# Patient Record
Sex: MALE | URBAN - METROPOLITAN AREA
[De-identification: names, ages, dates, MRNs, and addresses within clinical notes are randomized per-mention and may not be internally consistent; named-entity substitution may affect disease eponyms.]

---

## 2021-11-15 ENCOUNTER — TELEPHONE (OUTPATIENT)
Dept: FAMILY MEDICINE CLINIC | Facility: CLINIC | Age: 66
End: 2021-11-15

## 2024-11-19 ENCOUNTER — HOSPITAL ENCOUNTER (EMERGENCY)
Facility: HOSPITAL | Age: 69
Discharge: HOME | End: 2024-11-19
Attending: EMERGENCY MEDICINE
Payer: COMMERCIAL

## 2024-11-19 ENCOUNTER — TELEPHONE (OUTPATIENT)
Age: 69
End: 2024-11-19

## 2024-11-19 ENCOUNTER — OFFICE VISIT (OUTPATIENT)
Dept: URGENT CARE | Facility: CLINIC | Age: 69
End: 2024-11-19
Payer: COMMERCIAL

## 2024-11-19 ENCOUNTER — APPOINTMENT (EMERGENCY)
Dept: RADIOLOGY | Facility: HOSPITAL | Age: 69
End: 2024-11-19
Payer: COMMERCIAL

## 2024-11-19 VITALS
HEART RATE: 107 BPM | DIASTOLIC BLOOD PRESSURE: 74 MMHG | RESPIRATION RATE: 20 BRPM | WEIGHT: 97.8 LBS | SYSTOLIC BLOOD PRESSURE: 120 MMHG | OXYGEN SATURATION: 98 % | TEMPERATURE: 98.4 F

## 2024-11-19 VITALS
DIASTOLIC BLOOD PRESSURE: 87 MMHG | HEIGHT: 70 IN | TEMPERATURE: 98.2 F | HEART RATE: 92 BPM | OXYGEN SATURATION: 98 % | WEIGHT: 95.24 LBS | SYSTOLIC BLOOD PRESSURE: 135 MMHG | RESPIRATION RATE: 18 BRPM | BODY MASS INDEX: 13.63 KG/M2

## 2024-11-19 DIAGNOSIS — R68.81 EARLY SATIETY: ICD-10-CM

## 2024-11-19 DIAGNOSIS — M79.89 LEG SWELLING: ICD-10-CM

## 2024-11-19 DIAGNOSIS — F10.90 ALCOHOL USE DISORDER: ICD-10-CM

## 2024-11-19 DIAGNOSIS — R64 CACHECTIC (HCC): ICD-10-CM

## 2024-11-19 DIAGNOSIS — F40.00 AGORAPHOBIA: ICD-10-CM

## 2024-11-19 DIAGNOSIS — R33.9 URINARY RETENTION: Primary | ICD-10-CM

## 2024-11-19 DIAGNOSIS — N39.0 UTI (URINARY TRACT INFECTION): Primary | ICD-10-CM

## 2024-11-19 LAB
ANION GAP SERPL CALCULATED.3IONS-SCNC: 11 MMOL/L (ref 4–13)
BACTERIA UR QL AUTO: ABNORMAL /HPF
BASOPHILS # BLD AUTO: 0.02 THOUSANDS/ÂΜL (ref 0–0.1)
BASOPHILS NFR BLD AUTO: 0 % (ref 0–1)
BILIRUB UR QL STRIP: NEGATIVE
BNP SERPL-MCNC: 66 PG/ML (ref 0–100)
BUN SERPL-MCNC: 8 MG/DL (ref 5–25)
CALCIUM SERPL-MCNC: 9 MG/DL (ref 8.4–10.2)
CAOX CRY URNS QL MICRO: ABNORMAL /HPF
CHLORIDE SERPL-SCNC: 103 MMOL/L (ref 96–108)
CLARITY UR: ABNORMAL
CO2 SERPL-SCNC: 24 MMOL/L (ref 21–32)
COLOR UR: ABNORMAL
CREAT SERPL-MCNC: 0.87 MG/DL (ref 0.6–1.3)
EOSINOPHIL # BLD AUTO: 0.16 THOUSAND/ÂΜL (ref 0–0.61)
EOSINOPHIL NFR BLD AUTO: 2 % (ref 0–6)
ERYTHROCYTE [DISTWIDTH] IN BLOOD BY AUTOMATED COUNT: 13.9 % (ref 11.6–15.1)
GFR SERPL CREATININE-BSD FRML MDRD: 88 ML/MIN/1.73SQ M
GLUCOSE SERPL-MCNC: 107 MG/DL (ref 65–140)
GLUCOSE UR STRIP-MCNC: NEGATIVE MG/DL
HCT VFR BLD AUTO: 50.5 % (ref 36.5–49.3)
HGB BLD-MCNC: 17.3 G/DL (ref 12–17)
HGB UR QL STRIP.AUTO: ABNORMAL
IMM GRANULOCYTES # BLD AUTO: 0.02 THOUSAND/UL (ref 0–0.2)
IMM GRANULOCYTES NFR BLD AUTO: 0 % (ref 0–2)
KETONES UR STRIP-MCNC: ABNORMAL MG/DL
LEUKOCYTE ESTERASE UR QL STRIP: NEGATIVE
LYMPHOCYTES # BLD AUTO: 1.22 THOUSANDS/ÂΜL (ref 0.6–4.47)
LYMPHOCYTES NFR BLD AUTO: 15 % (ref 14–44)
MCH RBC QN AUTO: 35.4 PG (ref 26.8–34.3)
MCHC RBC AUTO-ENTMCNC: 34.3 G/DL (ref 31.4–37.4)
MCV RBC AUTO: 103 FL (ref 82–98)
MONOCYTES # BLD AUTO: 0.52 THOUSAND/ÂΜL (ref 0.17–1.22)
MONOCYTES NFR BLD AUTO: 7 % (ref 4–12)
MUCOUS THREADS UR QL AUTO: ABNORMAL
NEUTROPHILS # BLD AUTO: 6.12 THOUSANDS/ÂΜL (ref 1.85–7.62)
NEUTS SEG NFR BLD AUTO: 76 % (ref 43–75)
NITRITE UR QL STRIP: NEGATIVE
NON-SQ EPI CELLS URNS QL MICRO: ABNORMAL /HPF
NRBC BLD AUTO-RTO: 0 /100 WBCS
PH UR STRIP.AUTO: 6 [PH]
PLATELET # BLD AUTO: 224 THOUSANDS/UL (ref 149–390)
PMV BLD AUTO: 9.6 FL (ref 8.9–12.7)
POTASSIUM SERPL-SCNC: 4.2 MMOL/L (ref 3.5–5.3)
PROT UR STRIP-MCNC: ABNORMAL MG/DL
RBC # BLD AUTO: 4.89 MILLION/UL (ref 3.88–5.62)
RBC #/AREA URNS AUTO: ABNORMAL /HPF
SL AMB  POCT GLUCOSE, UA: NEGATIVE
SL AMB LEUKOCYTE ESTERASE,UA: ABNORMAL
SL AMB POCT BILIRUBIN,UA: ABNORMAL
SL AMB POCT BLOOD,UA: ABNORMAL
SL AMB POCT CLARITY,UA: ABNORMAL
SL AMB POCT COLOR,UA: ABNORMAL
SL AMB POCT KETONES,UA: 15
SL AMB POCT NITRITE,UA: NEGATIVE
SL AMB POCT PH,UA: 6.5
SL AMB POCT SPECIFIC GRAVITY,UA: 1.03
SL AMB POCT URINE PROTEIN: 30
SL AMB POCT UROBILINOGEN: 1
SODIUM SERPL-SCNC: 138 MMOL/L (ref 135–147)
SP GR UR STRIP.AUTO: >=1.03
UROBILINOGEN UR QL STRIP.AUTO: 1 E.U./DL
WBC # BLD AUTO: 8.06 THOUSAND/UL (ref 4.31–10.16)
WBC #/AREA URNS AUTO: ABNORMAL /HPF

## 2024-11-19 PROCEDURE — 87186 SC STD MICRODIL/AGAR DIL: CPT | Performed by: NURSE PRACTITIONER

## 2024-11-19 PROCEDURE — 36415 COLL VENOUS BLD VENIPUNCTURE: CPT | Performed by: EMERGENCY MEDICINE

## 2024-11-19 PROCEDURE — 93005 ELECTROCARDIOGRAM TRACING: CPT

## 2024-11-19 PROCEDURE — 81003 URINALYSIS AUTO W/O SCOPE: CPT | Performed by: EMERGENCY MEDICINE

## 2024-11-19 PROCEDURE — 99284 EMERGENCY DEPT VISIT MOD MDM: CPT

## 2024-11-19 PROCEDURE — 99284 EMERGENCY DEPT VISIT MOD MDM: CPT | Performed by: EMERGENCY MEDICINE

## 2024-11-19 PROCEDURE — 87077 CULTURE AEROBIC IDENTIFY: CPT | Performed by: NURSE PRACTITIONER

## 2024-11-19 PROCEDURE — 87086 URINE CULTURE/COLONY COUNT: CPT | Performed by: NURSE PRACTITIONER

## 2024-11-19 PROCEDURE — 83880 ASSAY OF NATRIURETIC PEPTIDE: CPT | Performed by: EMERGENCY MEDICINE

## 2024-11-19 PROCEDURE — 96360 HYDRATION IV INFUSION INIT: CPT

## 2024-11-19 PROCEDURE — 81001 URINALYSIS AUTO W/SCOPE: CPT | Performed by: EMERGENCY MEDICINE

## 2024-11-19 PROCEDURE — 99205 OFFICE O/P NEW HI 60 MIN: CPT | Performed by: NURSE PRACTITIONER

## 2024-11-19 PROCEDURE — 80048 BASIC METABOLIC PNL TOTAL CA: CPT | Performed by: EMERGENCY MEDICINE

## 2024-11-19 PROCEDURE — 85025 COMPLETE CBC W/AUTO DIFF WBC: CPT | Performed by: EMERGENCY MEDICINE

## 2024-11-19 PROCEDURE — 81002 URINALYSIS NONAUTO W/O SCOPE: CPT | Performed by: NURSE PRACTITIONER

## 2024-11-19 PROCEDURE — 71045 X-RAY EXAM CHEST 1 VIEW: CPT

## 2024-11-19 RX ORDER — CEFUROXIME AXETIL 500 MG/1
500 TABLET ORAL EVERY 12 HOURS SCHEDULED
Qty: 14 TABLET | Refills: 0 | Status: SHIPPED | OUTPATIENT
Start: 2024-11-19 | End: 2024-11-26

## 2024-11-19 RX ORDER — CEFUROXIME AXETIL 250 MG/1
500 TABLET ORAL ONCE
Status: COMPLETED | OUTPATIENT
Start: 2024-11-19 | End: 2024-11-19

## 2024-11-19 RX ORDER — LORAZEPAM 1 MG/1
2 TABLET ORAL ONCE
Qty: 2 TABLET | Refills: 0 | Status: SHIPPED | OUTPATIENT
Start: 2024-11-19 | End: 2024-11-19

## 2024-11-19 RX ADMIN — SODIUM CHLORIDE 1000 ML: 0.9 INJECTION, SOLUTION INTRAVENOUS at 14:47

## 2024-11-19 RX ADMIN — CEFUROXIME AXETIL 500 MG: 250 TABLET ORAL at 15:41

## 2024-11-19 NOTE — TELEPHONE ENCOUNTER
hSannon from urgent care calling as patient is there with urinary retention and refusing to go to the emergency room as he has a fear of emergency rooms and traveling outside of [place]. Explained there are no providers in the [place] office today and unfortunately he is a new patient and there are no opening today in the next closest office in [place]. Emergency room precautions reviewed.  Shannon verbalized understanding.

## 2024-11-19 NOTE — PATIENT INSTRUCTIONS
Report to the ER for further evaluation--I am concerned that you are retaining urine.  Afterwards, definitely schedule a family provider appointment for further evaluation.  Patient Education     Failure to Thrive, Adult   About this topic   Failure to thrive or FTT can happen in adults as well as in children. Most of the time, adults slowly seem to be less healthy. They may lose weight and have more health problems. Adults with FTT may also have a poor appetite and poor nutrition. They may show signs of low mood and be less active.  Treatment will depend on the cause. Your doctor may order drugs and treat other health problems. The doctor may work to help your eating habits. You may need a feeding tube to give nutrition. Your doctor may admit you to the hospital if you have very bad nutrition.  What are the causes?   Illnesses like cancer; diabetes; heart, stomach, kidney, or liver problems  Trouble moving about from something like a fall, surgery, arthritis, or a stroke  Drugs to treat certain conditions  Lack of food or not able to get or cook food  Mental health issues, eating disorders, or memory problems  Elder abuse or neglect  What are the main signs?   Weight loss  Not feeling hungry  Poor food intake  Less active  Low fluid intake  Low mood  Memory loss  How does the doctor diagnose this health problem?   The doctor will ask you about your health history and do an exam. The doctor will ask you about:  How often you eat  Any swallowing problems  Your bowel movements  Upset stomach and throwing up  Your activity level  What drugs you take  The doctor may ask you for a list of foods you eat and drink. This will help the doctor check the amount of calories you are taking.  Your doctor may order blood, urine, or other tests.  How does the doctor treat this health problem?   Your care is based on the cause or findings. The doctor may ask you to see a dietitian or other expert to help improve your weight  gain.  Are there other health problems to treat?   Your doctor may ask you to change the types and the amount of foods you eat. The doctor may order vitamins and minerals.  If other illnesses are found, those will be treated.  What drugs may be needed?   The doctor may order drugs to:  Add more calories, vitamins, and minerals into your diet  Increase your appetite  Treat other problems that may be keeping you from gaining weight  The doctor may also look at your drugs to see if any of them could be causing this problem.  What changes to diet are needed?   Talk with your doctor or dietitian about the best foods to eat.  You may need to have 3 meals and 3 snacks a day. Set a schedule so the snack is more than an hour before or after a meal.  Eat healthy high calorie and protein snacks if you need to gain or maintain your weight. Good snacks are crackers and peanut butter, cheese, eggs, pudding, yogurt, cottage cheese, cereal, nuts, and fresh fruit or vegetables. Your doctor or dietitian may recommend that you drink a high calorie nutrition supplement.  What problems could happen?   More weight loss  Lack of interest in things around you  Hair loss  Low mood  Higher risk for infection  Not able to move about  Loss of life  What can be done to prevent this health problem?   Mealtime should be relaxed and social. Eat with other family members or friends. Focus on pleasant conversation, rather than how much you are eating.  Stay active. Take walks or run errands if possible. Play games, do puzzles or crafts.  Be sure glasses, hearing aids, and dentures fit well and are working.  Last Reviewed Date   2021-02-24  Consumer Information Use and Disclaimer   This generalized information is a limited summary of diagnosis, treatment, and/or medication information. It is not meant to be comprehensive and should be used as a tool to help the user understand and/or assess potential diagnostic and treatment options. It does NOT  "include all information about conditions, treatments, medications, side effects, or risks that may apply to a specific patient. It is not intended to be medical advice or a substitute for the medical advice, diagnosis, or treatment of a health care provider based on the health care provider's examination and assessment of a patient’s specific and unique circumstances. Patients must speak with a health care provider for complete information about their health, medical questions, and treatment options, including any risks or benefits regarding use of medications. This information does not endorse any treatments or medications as safe, effective, or approved for treating a specific patient. UpToDate, Inc. and its affiliates disclaim any warranty or liability relating to this information or the use thereof. The use of this information is governed by the Terms of Use, available at https://www.[employer]/en/know/clinical-effectiveness-terms   Copyright   Copyright © 2024 UpToDate, Inc. and its affiliates and/or licensors. All rights reserved.  Patient Education     Malnutrition   The Basics   Written by the doctors and editors at [employer]   What is malnutrition? -- Malnutrition is the medical term for when your body is not getting the right nutrients to meet its needs. Doctors might also use the term \"malnourished\" to describe a person with malnutrition.  The food we eat is made up of nutrients. The human body needs certain types of nutrients, in certain amounts, to work properly. There are 2 main groups of nutrients we get through food:   Macronutrients - These are proteins, fats, and carbohydrates. These give the body energy. You need a balance of all 3 types.   Micronutrients - These are vitamins and minerals. They help to make sure that the body is working well.  When most people hear \"malnutrition,\" they think of a specific type of malnutrition called \"undernutrition.\" This is when the body is not getting " "enough nutrients.  This article will use the word \"malnutrition\" to mean \"undernutrition.\" But there is another type of malnutrition called \"overnutrition.\" This is when the body is getting too many nutrients.  What are the symptoms of malnutrition? -- Symptoms can include:   Losing weight or having a low body weight   Loss of muscle or fat   Lack of appetite   Feeling very tired or weak   Changes to the skin and hair   Mood changes   Swelling (from the body holding on to water)  A person can be overweight and still be malnourished. For example, if a person is overweight but not getting enough protein or other important nutrients, they could still be malnourished.  What are the causes of malnutrition? -- There are many different causes of malnutrition. These include:   Not having access to nutritious food - In some cases, healthy food can be hard to get or very expensive. Or people might have easy access to things like processed snacks or fast food, but have a harder time getting meat, fruit, or vegetables. Older adults might also have trouble leaving home to shop for themselves. All of these things can make it hard to get enough food, or to get the right nutrients.   Not knowing about proper nutrition - Many people are not taught about nutrition. They might not know if they are getting the right balance of nutrients.   Lack of appetite - This can be caused by a health problem, medicine, treatment, depression, or alcohol or substance use. For example, cancer can make people lose weight. So can certain cancer treatments.   Problems eating, chewing, or swallowing - Some people have trouble eating enough because of nausea. Or they might have trouble chewing or swallowing because of problems with the mouth or esophagus. (The esophagus is the tube that connects the mouth to the stomach.)   Cachexia - This is a problem that happens with certain long-term illnesses. With cachexia, a person often loses their appetite. But " there are also changes in the way the body uses nutrients. The body starts burning more calories than it is taking in.   Malabsorption - This is when the small intestine has trouble absorbing nutrients from food. (The small intestine is part of the digestive system.) This means that nutrients pass out of the body without being used.   Eating disorders - These are a type of mental health problem that affects a person's behavior around eating. For example, a person with anorexia nervosa might want to weigh less than is healthy. Because of this, they might not eat enough nutrients.   Serious illness, surgery, or injury - Certain serious illnesses, surgeries, or injuries can make it hard to eat. This is especially true if you have to stay in the hospital or are very sick.  How is malnutrition diagnosed? -- The doctor, dietitian (nutrition expert), or nurse will start by asking you questions and doing an exam. They might also:   Weigh you, to see if your weight has changed   Measure your body fat   Measure your muscle mass and strength   Do lab tests to check for specific micronutrient imbalances or other problems  How is malnutrition treated? -- This depends on what is causing your malnutrition, and how severe your symptoms are. Generally, your doctor, dietitian, or nurse will try to treat the underlying cause of malnutrition.  Other treatments are done to increase your nutrients. These might include:   Oral nutritional supplements - These are special foods designed to be a quick source of calories and protein. Many come as a drink. But there are also other forms such as powders or bars. These can be found at a grocery store or pharmacy. Sample brand names include Boost, Ensure, and Premier Protein.   Nutrition support - This is when you get nutrients through a feeding tube or IV.   Working with a dietitian - A dietitian is an expert on food and nutrition. They will help make sure that you are getting the right  nutrients for your body.   Vitamin and mineral supplements  If your malnutrition is severe, you might need to be treated in the hospital.  Can malnutrition be prevented? -- Maybe. Not all causes of malnutrition can be prevented. Some people also have a higher risk because of things like where they live. But in general, it helps to learn about nutrition and eat a well-balanced diet. You can also learn more about how to prepare food in ways that help you get the most nutrients.  When should I call the doctor? -- Call for advice if:   You gain or lose weight without trying to.   You feel faint, weak, or very tired - These can be signs of anemia. This is a problem with red blood cells. One type of anemia is caused by not having enough iron.   You have tingling or numbness and pain in the hand or feet, or bone pain - These can be signs of a vitamin deficiency or anemia.  All topics are updated as new evidence becomes available and our peer review process is complete.  This topic retrieved from [employer] on: Feb 26, 2024.  Topic 979567 Version 1.0  Release: 32.2.4 - C32.56  © 2024 UpToDate, Inc. and/or its affiliates. All rights reserved.  Consumer Information Use and Disclaimer   Disclaimer: This generalized information is a limited summary of diagnosis, treatment, and/or medication information. It is not meant to be comprehensive and should be used as a tool to help the user understand and/or assess potential diagnostic and treatment options. It does NOT include all information about conditions, treatments, medications, side effects, or risks that may apply to a specific patient. It is not intended to be medical advice or a substitute for the medical advice, diagnosis, or treatment of a health care provider based on the health care provider's examination and assessment of a patient's specific and unique circumstances. Patients must speak with a health care provider for complete information about their health, medical  "questions, and treatment options, including any risks or benefits regarding use of medications. This information does not endorse any treatments or medications as safe, effective, or approved for treating a specific patient. UpToDate, Inc. and its affiliates disclaim any warranty or liability relating to this information or the use thereof.The use of this information is governed by the Terms of Use, available at https://www.[employer].com/en/know/clinical-effectiveness-terms. 2024© UpToDate, Inc. and its affiliates and/or licensors. All rights reserved.  Copyright   © 2024 UpToDate, Inc. and/or its affiliates. All rights reserved.  Patient Education     Alcohol and your health   The Basics   Written by the doctors and editors at [employer]   Is alcohol safe for me? -- There is not always a simple answer. Drinking any amount of alcohol comes with risks.  Some people should not drink any alcohol at all. This includes people who:   Are younger than the legal drinking age   Are pregnant or trying to get pregnant   Have liver disease or certain other health conditions   Are working with dangerous equipment or machines   Take certain medicines   Have had problems with alcohol use in the past  If you do choose to drink alcohol, it's important to know the risks.  How does alcohol affect my health? -- Alcohol can cause serious health problems. In general, the more a person drinks over time, the higher their risk of health problems.  Drinking increases the risk of:   Cancer - Heavy drinking increases the risk of several types of cancer. These include breast, esophageal, colorectal, pancreatic, liver, and head and neck cancer. Even having 1 to 2 drinks a day might increase the risk of some cancers, such as breast cancer.   Liver disease - People who drink a lot of alcohol can get something called \"cirrhosis.\" This is a disease that scars the liver. It can cause bleeding in the digestive tract, swelling, breathing problems, " "and death.   Heart problems - Heavy drinking increases the risk \"atrial fibrillation,\" which is an abnormal heart rhythm. It also increases the risk of high blood pressure and stroke. Over time, heavy drinking can lead to heart failure.   Pancreatitis - This is when the pancreas gets irritated or swollen. It can cause severe belly pain. Over time, it can make it hard for the body to digest food normally. It also increases a person's risk of diabetes.   Gout flares - In people with gout, drinking alcohol can trigger attacks, or \"flares.\"   Osteoporosis - People who drink heavily are more likely to have osteoporosis. This is a disease that makes your bones weak. It increases the risk of fractures (broken bones).   Problems with the brain and nerves - Over time, heavy drinking can lead to trouble with thinking and memory. It can also cause muscle weakness and problems with coordination.   Problems with sex  Drinking makes it hard to think clearly. It also affects decision-making and coordination. People who drink are more likely to:   Get into accidents   Die by suicide   Drown   Get seriously hurt   Hurt someone else  Can alcohol be good for you? -- It's not clear. There is some evidence that a small amount of alcohol might be good for heart health. Some studies suggest that this might be true for wine, but not for other types of alcohol. Other studies have not found any benefit. Also, drinking more than about 1 drink a day cancels out any possible benefit.  Doctors do not recommend drinking alcohol to try to improve your health.  How do doctors define different levels of drinking? -- Doctors use different definitions for \"moderate,\" \"heavy,\" and \"binge\" drinking. They are all based on a standard definition of a \"drink.\" In the [place], 1 drink equals:   12 ounces (about 350 mL) of beer   5 ounces (about 150 mL) of wine   1 shot (1.5 ounces or about 50 mL) of liquor  If a serving size is larger than these examples, it " "contains more alcohol.  Commonly used definitions include:   Moderate drinking - If people choose to drink, doctors usually recommend only \"moderate\" alcohol use. This means:   For males - No more than 2 drinks a day   For females - No more than 1 drink a day  The guidelines are different for males and females for several reasons. These include differences in average body size, the amount of water in the body, and how the body processes alcohol.   Heavy drinking - This means drinking enough to increase your risk of health problems. It is sometimes called \"risky\" drinking. This usually means:   For males under 65 years - More than 14 drinks a week or 4 drinks on any day.   For females and males 65 years and older - More than 7 drinks a week or 3 drinks on any day.   \"Binge\" drinking - This means drinking a lot of alcohol in a short time (about 2 hours). It is usually defined as 5 or more drinks for males, or 4 or more drinks for females. Binge drinking is dangerous, even if it does not happen often. That's because people are more likely to get hurt or make dangerous decisions while drunk.  What if I want to get pregnant? -- Drinking alcohol during pregnancy can cause serious problems. If you want to get pregnant, it's best to stop drinking before you start trying.  Drinking during pregnancy increases the risk of:   Your baby having \"fetal alcohol spectrum disorder\" - This causes brain damage and growth problems. Compared with healthy babies, babies with this condition tend to weigh less, have smaller heads, and be very fussy. When they grow up, they have life-long problems with thinking and behavior.   Pregnancy loss or stillbirth - Pregnancy loss is when a pregnancy ends before 20 weeks. It is also called \"miscarriage.\" Stillbirth is when a baby dies before it is born, in the second half of pregnancy (after 20 weeks).  Because of these risks, doctors recommend completely avoiding alcohol during pregnancy. No amount " "of alcohol is known to be safe during pregnancy.  What should I do if I think that I have a drinking problem? -- Some people have problems with alcohol. They might:   Drink more than they mean to   Need more and more alcohol to get the same effects   Notice symptoms if they drink less   Have a strong need or craving to drink alcohol   Be unable to stop or limit their drinking   Have problems with work or relationships because of their drinking  If you think that you might have a problem with alcohol, talk to your doctor or nurse. When a person becomes addicted to alcohol, doctors call this \"alcohol use disorder.\" There are treatments that can help.  Many people can cut back on drinking on their own. But if you have been drinking several days a week for weeks in a row, do not try to cut back without the help of a doctor or nurse. Stopping or reducing drinking too quickly can cause something called \"alcohol withdrawal.\" This can cause symptoms and, in some cases, even lead to death.  All topics are updated as new evidence becomes available and our peer review process is complete.  This topic retrieved from [employer] on: May 01, 2024.  Topic 230433 Version 1.0  Release: 32.4.2 - C32.120  © 2024 UpToDate, Inc. and/or its affiliates. All rights reserved.  Consumer Information Use and Disclaimer   Disclaimer: This generalized information is a limited summary of diagnosis, treatment, and/or medication information. It is not meant to be comprehensive and should be used as a tool to help the user understand and/or assess potential diagnostic and treatment options. It does NOT include all information about conditions, treatments, medications, side effects, or risks that may apply to a specific patient. It is not intended to be medical advice or a substitute for the medical advice, diagnosis, or treatment of a health care provider based on the health care provider's examination and assessment of a patient's specific and unique " circumstances. Patients must speak with a health care provider for complete information about their health, medical questions, and treatment options, including any risks or benefits regarding use of medications. This information does not endorse any treatments or medications as safe, effective, or approved for treating a specific patient. UpToDate, Inc. and its affiliates disclaim any warranty or liability relating to this information or the use thereof.The use of this information is governed by the Terms of Use, available at https://www.[employer].com/en/know/clinical-effectiveness-terms. 2024© UpToDate, Inc. and its affiliates and/or licensors. All rights reserved.  Copyright   © 2024 UpToDate, Inc. and/or its affiliates. All rights reserved.  Patient Education     Alcohol use disorder - Discharge instructions   The Basics   Written by the doctors and editors at [employer]   What are discharge instructions? -- Discharge instructions are information about how to take care of yourself after getting medical care for a health problem.  What is alcohol use disorder? -- This is the medical term for alcohol addiction or what most people think of as alcoholism. Alcohol problems are common, but there are treatments that can help.  Some people lose control of their drinking. They drink more than they mean to. Others find that they need more and more alcohol to get the same effects. Some people notice symptoms if they drink less. This means that their brain and body are physically addicted to alcohol. They have a strong need or craving to drink alcohol. Some people cannot stop or limit their drinking once they start.  People can have trouble with alcohol when they drink too much, too fast, or too often. A person can be at risk for accidents and problems if they drink too much, even if they do not have alcohol use disorder.  Can I stop drinking on my own? -- Many people are able to cut back on drinking on their own. But if  "you have been drinking several days a week for weeks in a row, do not try to drink less without the help of a doctor or nurse. Stopping or reducing drinking too quickly can cause something called \"alcohol withdrawal.\" This can cause symptoms and, in some cases, even lead to death.  How do I care for myself at home? -- Ask the doctor what you need to do when you go home. Make sure that you understand exactly what you need to do to care for yourself. Ask questions if there is anything you do not understand.   You might need help to quit or limit drinking. Talk with your doctor or nurse about the best plan for you. You might:   See a counselor, such as a psychologist, , or psychiatrist.   Go to a treatment center or special recovery center.   Take part in a recovery program while living at home.   Take part in a support group such as Alcoholics Anonymous (\"AA\").  All of these treatments can help, and they can be combined.   There are many things you can do to manage your drinking and lower your risk for problems. Some examples:   Some people choose to stop drinking alcohol completely. But if you have been drinking several days a week for weeks in a row, do not try to reduce without the help of a doctor or nurse. Stopping drinking abruptly can lead to withdrawal.   Other people continue to drink with some limits. Talk with your doctor or nurse to find the best option for you. If you do drink:   Limit the amount you drink, or alternate your drinks with a glass of water or other non-alcoholic drink.   Do not drink on an empty stomach. Food helps your body absorb alcohol more slowly.   Never drive if you have been drinking.   Talk to a friend or family member that you feel comfortable with and who can help you stay responsible and not drink.   Keep a drink journal. Write down how much you drink, where you were, and anything that might have triggered your drinking. This can help you learn more about your " "drinking patterns and make changes.   Make some changes in your daily habits, and try a new routine:   Avoid places, people, and situations that bring up thoughts of drinking.   Spend time with people who are not drinking alcohol. It can help to be with people who support your recovery and give you comfort, encouragement, and guidance.  What follow-up care do I need? -- Your doctor or counselor might want to check on your progress. Go to these appointments. Be honest with them about your progress, how you are feeling, and any problems.  When should I call the doctor? -- Alcohol poisoning is an emergency. Call for an ambulance (in the [place] and [place], call 9-1-1) if someone:   Stops breathing, or goes 10 seconds or more without breathing   Breathes very slowly (fewer than 8 breaths in 1 minute)   Turns blue or very pale, and their skin feels cool to the touch   Has a seizure   Passes out and cannot be woken up at all   Cannot stop vomiting   Looks very sick   Is not able to stand, or falls over and over again   Has a head injury, or a fall that could lead to a head injury  Delirium tremens (\"DTs\") is the most serious form of alcohol withdrawal. This is also an emergency. Call for an ambulance (in the  and [place], call 9-1-1) if someone:   Has hallucinations - This is when a person sees, hears, feels, smells, or tastes things that aren't there.   Is confused about where they are or who they are   Feels very upset and anxious   Has shaking that cannot be controlled   Has a fast heartbeat   Has a fever of 100.4°F (38°C) or higher   Is sweating a lot  Call your doctor for advice if you have milder symptoms of alcohol withdrawal. These might include:   Trouble sleeping   Headache   Nausea  You should also call your doctor if you are having trouble drinking less, or if you have any other symptoms that worry you.  All topics are updated as new evidence becomes available and our peer review process is complete.  This topic " retrieved from [address] on: Apr 11, 2024.  Topic 958741 Version 2.0  Release: 32.3.2 - C32.100  © 2024 [employer] and/or its affiliates. All rights reserved.  Consumer Information Use and Disclaimer   Disclaimer: This generalized information is a limited summary of diagnosis, treatment, and/or medication information. It is not meant to be comprehensive and should be used as a tool to help the user understand and/or assess potential diagnostic and treatment options. It does NOT include all information about conditions, treatments, medications, side effects, or risks that may apply to a specific patient. It is not intended to be medical advice or a substitute for the medical advice, diagnosis, or treatment of a health care provider based on the health care provider's examination and assessment of a patient's specific and unique circumstances. Patients must speak with a health care provider for complete information about their health, medical questions, and treatment options, including any risks or benefits regarding use of medications. This information does not endorse any treatments or medications as safe, effective, or approved for treating a specific patient. [employer] and its affiliates disclaim any warranty or liability relating to this information or the use thereof.The use of this information is governed by the Terms of Use, available at https://www.wolterskluwer.com/en/know/clinical-effectiveness-terms. 2024© [employer] and its affiliates and/or licensors. All rights reserved.  Copyright   © 2024 [employer] and/or its affiliates. All rights reserved.

## 2024-11-19 NOTE — PROGRESS NOTES
[facility]        NAME: Joshua Aly is a 69 y.o. male  : 1955    MRN: [id number]  DATE: 2024  TIME: 3:10 PM      Assessment and Plan     Urinary retention [R33.9]  1. Urinary retention  Ambulatory Referral to Family Practice    Transfer to other facility    Ambulatory Referral to Urology    POCT urine dip    Urine culture    Urine culture      2. Cachectic (HCC)  Ambulatory Referral to Family Practice    Ambulatory Referral to Gastroenterology    Transfer to other facility      3. Leg swelling  Ambulatory Referral to Family Practice    Transfer to other facility      4. Early satiety  Ambulatory Referral to Family Practice    Ambulatory Referral to Gastroenterology    Transfer to other facility      5. Alcohol use disorder  Ambulatory Referral to Family Practice    Ambulatory Referral to Gastroenterology    Transfer to other facility      6. Agoraphobia  LORazepam (ATIVAN) 1 mg tablet    Ambulatory Referral to Urology        Patient presents in exam room by himself although brother and sister-in-law wait for him in the waiting room.  Brother drove him here.    Patient Instructions     Patient Instructions   Report to the ER for further evaluation--I am concerned that you are retaining urine.  Afterwards, definitely schedule a family provider appointment for further evaluation.  Patient Education     Failure to Thrive, Adult   About this topic   Failure to thrive or FTT can happen in adults as well as in children. Most of the time, adults slowly seem to be less healthy. They may lose weight and have more health problems. Adults with FTT may also have a poor appetite and poor nutrition. They may show signs of low mood and be less active.  Treatment will depend on the cause. Your doctor may order drugs and treat other health problems. The doctor may work to help your eating habits. You may need a feeding tube to give nutrition. Your doctor may admit you to the hospital if you have  very bad nutrition.  What are the causes?   Illnesses like cancer; diabetes; heart, stomach, kidney, or liver problems  Trouble moving about from something like a fall, surgery, arthritis, or a stroke  Drugs to treat certain conditions  Lack of food or not able to get or cook food  Mental health issues, eating disorders, or memory problems  Elder abuse or neglect  What are the main signs?   Weight loss  Not feeling hungry  Poor food intake  Less active  Low fluid intake  Low mood  Memory loss  How does the doctor diagnose this health problem?   The doctor will ask you about your health history and do an exam. The doctor will ask you about:  How often you eat  Any swallowing problems  Your bowel movements  Upset stomach and throwing up  Your activity level  What drugs you take  The doctor may ask you for a list of foods you eat and drink. This will help the doctor check the amount of calories you are taking.  Your doctor may order blood, urine, or other tests.  How does the doctor treat this health problem?   Your care is based on the cause or findings. The doctor may ask you to see a dietitian or other expert to help improve your weight gain.  Are there other health problems to treat?   Your doctor may ask you to change the types and the amount of foods you eat. The doctor may order vitamins and minerals.  If other illnesses are found, those will be treated.  What drugs may be needed?   The doctor may order drugs to:  Add more calories, vitamins, and minerals into your diet  Increase your appetite  Treat other problems that may be keeping you from gaining weight  The doctor may also look at your drugs to see if any of them could be causing this problem.  What changes to diet are needed?   Talk with your doctor or dietitian about the best foods to eat.  You may need to have 3 meals and 3 snacks a day. Set a schedule so the snack is more than an hour before or after a meal.  Eat healthy high calorie and protein snacks  if you need to gain or maintain your weight. Good snacks are crackers and peanut butter, cheese, eggs, pudding, yogurt, cottage cheese, cereal, nuts, and fresh fruit or vegetables. Your doctor or dietitian may recommend that you drink a high calorie nutrition supplement.  What problems could happen?   More weight loss  Lack of interest in things around you  Hair loss  Low mood  Higher risk for infection  Not able to move about  Loss of life  What can be done to prevent this health problem?   Mealtime should be relaxed and social. Eat with other family members or friends. Focus on pleasant conversation, rather than how much you are eating.  Stay active. Take walks or run errands if possible. Play games, do puzzles or crafts.  Be sure glasses, hearing aids, and dentures fit well and are working.  Last Reviewed Date   2021-02-24  Consumer Information Use and Disclaimer   This generalized information is a limited summary of diagnosis, treatment, and/or medication information. It is not meant to be comprehensive and should be used as a tool to help the user understand and/or assess potential diagnostic and treatment options. It does NOT include all information about conditions, treatments, medications, side effects, or risks that may apply to a specific patient. It is not intended to be medical advice or a substitute for the medical advice, diagnosis, or treatment of a health care provider based on the health care provider's examination and assessment of a patient’s specific and unique circumstances. Patients must speak with a health care provider for complete information about their health, medical questions, and treatment options, including any risks or benefits regarding use of medications. This information does not endorse any treatments or medications as safe, effective, or approved for treating a specific patient. UpTo[employer], Inc[employer] and its affiliates disclaim any warranty or liability relating to this information or  "the use thereof. The use of this information is governed by the Terms of Use, available at https://www.wolterskluwer.com/en/know/clinical-effectiveness-terms   Copyright   Copyright © 2024 UpTo[employer], Inc. and its affiliates and/or licensors. All rights reserved.  Patient Education     Malnutrition   The Basics   Written by the doctors and editors at [facility]   What is malnutrition? -- Malnutrition is the medical term for when your body is not getting the right nutrients to meet its needs. Doctors might also use the term \"malnourished\" to describe a person with malnutrition.  The food we eat is made up of nutrients. The human body needs certain types of nutrients, in certain amounts, to work properly. There are 2 main groups of nutrients we get through food:   Macronutrients - These are proteins, fats, and carbohydrates. These give the body energy. You need a balance of all 3 types.   Micronutrients - These are vitamins and minerals. They help to make sure that the body is working well.  When most people hear \"malnutrition,\" they think of a specific type of malnutrition called \"undernutrition.\" This is when the body is not getting enough nutrients.  This article will use the word \"malnutrition\" to mean \"undernutrition.\" But there is another type of malnutrition called \"overnutrition.\" This is when the body is getting too many nutrients.  What are the symptoms of malnutrition? -- Symptoms can include:   Losing weight or having a low body weight   Loss of muscle or fat   Lack of appetite   Feeling very tired or weak   Changes to the skin and hair   Mood changes   Swelling (from the body holding on to water)  A person can be overweight and still be malnourished. For example, if a person is overweight but not getting enough protein or other important nutrients, they could still be malnourished.  What are the causes of malnutrition? -- There are many different causes of malnutrition. These include:   Not having access to " nutritious food - In some cases, healthy food can be hard to get or very expensive. Or people might have easy access to things like processed snacks or fast food, but have a harder time getting meat, fruit, or vegetables. Older adults might also have trouble leaving home to shop for themselves. All of these things can make it hard to get enough food, or to get the right nutrients.   Not knowing about proper nutrition - Many people are not taught about nutrition. They might not know if they are getting the right balance of nutrients.   Lack of appetite - This can be caused by a health problem, medicine, treatment, depression, or alcohol or substance use. For example, cancer can make people lose weight. So can certain cancer treatments.   Problems eating, chewing, or swallowing - Some people have trouble eating enough because of nausea. Or they might have trouble chewing or swallowing because of problems with the mouth or esophagus. (The esophagus is the tube that connects the mouth to the stomach.)   Cachexia - This is a problem that happens with certain long-term illnesses. With cachexia, a person often loses their appetite. But there are also changes in the way the body uses nutrients. The body starts burning more calories than it is taking in.   Malabsorption - This is when the small intestine has trouble absorbing nutrients from food. (The small intestine is part of the digestive system.) This means that nutrients pass out of the body without being used.   Eating disorders - These are a type of mental health problem that affects a person's behavior around eating. For example, a person with anorexia nervosa might want to weigh less than is healthy. Because of this, they might not eat enough nutrients.   Serious illness, surgery, or injury - Certain serious illnesses, surgeries, or injuries can make it hard to eat. This is especially true if you have to stay in the hospital or are very sick.  How is malnutrition  diagnosed? -- The doctor, dietitian (nutrition expert), or nurse will start by asking you questions and doing an exam. They might also:   Weigh you, to see if your weight has changed   Measure your body fat   Measure your muscle mass and strength   Do lab tests to check for specific micronutrient imbalances or other problems  How is malnutrition treated? -- This depends on what is causing your malnutrition, and how severe your symptoms are. Generally, your doctor, dietitian, or nurse will try to treat the underlying cause of malnutrition.  Other treatments are done to increase your nutrients. These might include:   Oral nutritional supplements - These are special foods designed to be a quick source of calories and protein. Many come as a drink. But there are also other forms such as powders or bars. These can be found at a grocery store or pharmacy. Sample brand names include Boost, Ensure, and Premier Protein.   Nutrition support - This is when you get nutrients through a feeding tube or IV.   Working with a dietitian - A dietitian is an expert on food and nutrition. They will help make sure that you are getting the right nutrients for your body.   Vitamin and mineral supplements  If your malnutrition is severe, you might need to be treated in the hospital.  Can malnutrition be prevented? -- Maybe. Not all causes of malnutrition can be prevented. Some people also have a higher risk because of things like where they live. But in general, it helps to learn about nutrition and eat a well-balanced diet. You can also learn more about how to prepare food in ways that help you get the most nutrients.  When should I call the doctor? -- Call for advice if:   You gain or lose weight without trying to.   You feel faint, weak, or very tired - These can be signs of anemia. This is a problem with red blood cells. One type of anemia is caused by not having enough iron.   You have tingling or numbness and pain in the hand or feet,  or bone pain - These can be signs of a vitamin deficiency or anemia.  All topics are updated as new evidence becomes available and our peer review process is complete.  This topic retrieved from [email or web address] on: Feb 26, 2024.  Topic 844633 Version 1.0  Release: 32.2.4 - C32.56  © 2024 UpToDate, Inc. and/or its affiliates. All rights reserved.  Consumer Information Use and Disclaimer   Disclaimer: This generalized information is a limited summary of diagnosis, treatment, and/or medication information. It is not meant to be comprehensive and should be used as a tool to help the user understand and/or assess potential diagnostic and treatment options. It does NOT include all information about conditions, treatments, medications, side effects, or risks that may apply to a specific patient. It is not intended to be medical advice or a substitute for the medical advice, diagnosis, or treatment of a health care provider based on the health care provider's examination and assessment of a patient's specific and unique circumstances. Patients must speak with a health care provider for complete information about their health, medical questions, and treatment options, including any risks or benefits regarding use of medications. This information does not endorse any treatments or medications as safe, effective, or approved for treating a specific patient. UpToDate, Inc. and its affiliates disclaim any warranty or liability relating to this information or the use thereof.The use of this information is governed by the Terms of Use, available at https://www.woltersCebaTechuwer.com/en/know/clinical-effectiveness-terms. 2024© UpToDate, Inc. and its affiliates and/or licensors. All rights reserved.  Copyright   © 2024 UpToDate, Inc. and/or its affiliates. All rights reserved.  Patient Education     Alcohol and your health   The Basics   Written by the doctors and editors at [email or web address]   Is alcohol safe for me? -- There is not always a simple  "answer. Drinking any amount of alcohol comes with risks.  Some people should not drink any alcohol at all. This includes people who:   Are younger than the legal drinking age   Are pregnant or trying to get pregnant   Have liver disease or certain other health conditions   Are working with dangerous equipment or machines   Take certain medicines   Have had problems with alcohol use in the past  If you do choose to drink alcohol, it's important to know the risks.  How does alcohol affect my health? -- Alcohol can cause serious health problems. In general, the more a person drinks over time, the higher their risk of health problems.  Drinking increases the risk of:   Cancer - Heavy drinking increases the risk of several types of cancer. These include breast, esophageal, colorectal, pancreatic, liver, and head and neck cancer. Even having 1 to 2 drinks a day might increase the risk of some cancers, such as breast cancer.   Liver disease - People who drink a lot of alcohol can get something called \"cirrhosis.\" This is a disease that scars the liver. It can cause bleeding in the digestive tract, swelling, breathing problems, and death.   Heart problems - Heavy drinking increases the risk \"atrial fibrillation,\" which is an abnormal heart rhythm. It also increases the risk of high blood pressure and stroke. Over time, heavy drinking can lead to heart failure.   Pancreatitis - This is when the pancreas gets irritated or swollen. It can cause severe belly pain. Over time, it can make it hard for the body to digest food normally. It also increases a person's risk of diabetes.   Gout flares - In people with gout, drinking alcohol can trigger attacks, or \"flares.\"   Osteoporosis - People who drink heavily are more likely to have osteoporosis. This is a disease that makes your bones weak. It increases the risk of fractures (broken bones).   Problems with the brain and nerves - Over time, heavy drinking can lead to trouble with " "thinking and memory. It can also cause muscle weakness and problems with coordination.   Problems with sex  Drinking makes it hard to think clearly. It also affects decision-making and coordination. People who drink are more likely to:   Get into accidents   Die by suicide   Drown   Get seriously hurt   Hurt someone else  Can alcohol be good for you? -- It's not clear. There is some evidence that a small amount of alcohol might be good for heart health. Some studies suggest that this might be true for wine, but not for other types of alcohol. Other studies have not found any benefit. Also, drinking more than about 1 drink a day cancels out any possible benefit.  Doctors do not recommend drinking alcohol to try to improve your health.  How do doctors define different levels of drinking? -- Doctors use different definitions for \"moderate,\" \"heavy,\" and \"binge\" drinking. They are all based on a standard definition of a \"drink.\" In the [place], 1 drink equals:   12 ounces (about 350 mL) of beer   5 ounces (about 150 mL) of wine   1 shot (1.5 ounces or about 50 mL) of liquor  If a serving size is larger than these examples, it contains more alcohol.  Commonly used definitions include:   Moderate drinking - If people choose to drink, doctors usually recommend only \"moderate\" alcohol use. This means:   For males - No more than 2 drinks a day   For females - No more than 1 drink a day  The guidelines are different for males and females for several reasons. These include differences in average body size, the amount of water in the body, and how the body processes alcohol.   Heavy drinking - This means drinking enough to increase your risk of health problems. It is sometimes called \"risky\" drinking. This usually means:   For males under 65 years - More than 14 drinks a week or 4 drinks on any day.   For females and males 65 years and older - More than 7 drinks a week or 3 drinks on any day.   \"Binge\" drinking - This means drinking " "a lot of alcohol in a short time (about 2 hours). It is usually defined as 5 or more drinks for males, or 4 or more drinks for females. Binge drinking is dangerous, even if it does not happen often. That's because people are more likely to get hurt or make dangerous decisions while drunk.  What if I want to get pregnant? -- Drinking alcohol during pregnancy can cause serious problems. If you want to get pregnant, it's best to stop drinking before you start trying.  Drinking during pregnancy increases the risk of:   Your baby having \"fetal alcohol spectrum disorder\" - This causes brain damage and growth problems. Compared with healthy babies, babies with this condition tend to weigh less, have smaller heads, and be very fussy. When they grow up, they have life-long problems with thinking and behavior.   Pregnancy loss or stillbirth - Pregnancy loss is when a pregnancy ends before 20 weeks. It is also called \"miscarriage.\" Stillbirth is when a baby dies before it is born, in the second half of pregnancy (after 20 weeks).  Because of these risks, doctors recommend completely avoiding alcohol during pregnancy. No amount of alcohol is known to be safe during pregnancy.  What should I do if I think that I have a drinking problem? -- Some people have problems with alcohol. They might:   Drink more than they mean to   Need more and more alcohol to get the same effects   Notice symptoms if they drink less   Have a strong need or craving to drink alcohol   Be unable to stop or limit their drinking   Have problems with work or relationships because of their drinking  If you think that you might have a problem with alcohol, talk to your doctor or nurse. When a person becomes addicted to alcohol, doctors call this \"alcohol use disorder.\" There are treatments that can help.  Many people can cut back on drinking on their own. But if you have been drinking several days a week for weeks in a row, do not try to cut back without the " "help of a doctor or nurse. Stopping or reducing drinking too quickly can cause something called \"alcohol withdrawal.\" This can cause symptoms and, in some cases, even lead to death.  All topics are updated as new evidence becomes available and our peer review process is complete.  This topic retrieved from [email or web address] on: May 01, 2024.  Topic 718743 Version 1.0  Release: 32.4.2 - C32.120  © 2024 UpToDate, Inc. and/or its affiliates. All rights reserved.  Consumer Information Use and Disclaimer   Disclaimer: This generalized information is a limited summary of diagnosis, treatment, and/or medication information. It is not meant to be comprehensive and should be used as a tool to help the user understand and/or assess potential diagnostic and treatment options. It does NOT include all information about conditions, treatments, medications, side effects, or risks that may apply to a specific patient. It is not intended to be medical advice or a substitute for the medical advice, diagnosis, or treatment of a health care provider based on the health care provider's examination and assessment of a patient's specific and unique circumstances. Patients must speak with a health care provider for complete information about their health, medical questions, and treatment options, including any risks or benefits regarding use of medications. This information does not endorse any treatments or medications as safe, effective, or approved for treating a specific patient. UpToDate, Inc. and its affiliates disclaim any warranty or liability relating to this information or the use thereof.The use of this information is governed by the Terms of Use, available at https://www.wolterskluwer.com/en/know/clinical-effectiveness-terms. 2024© UpToDate, Inc. and its affiliates and/or licensors. All rights reserved.  Copyright   © 2024 UpToDate, Inc. and/or its affiliates. All rights reserved.  Patient Education     Alcohol use disorder - Discharge " "instructions   The Basics   Written by the doctors and editors at [facility]   What are discharge instructions? -- Discharge instructions are information about how to take care of yourself after getting medical care for a health problem.  What is alcohol use disorder? -- This is the medical term for alcohol addiction or what most people think of as alcoholism. Alcohol problems are common, but there are treatments that can help.  Some people lose control of their drinking. They drink more than they mean to. Others find that they need more and more alcohol to get the same effects. Some people notice symptoms if they drink less. This means that their brain and body are physically addicted to alcohol. They have a strong need or craving to drink alcohol. Some people cannot stop or limit their drinking once they start.  People can have trouble with alcohol when they drink too much, too fast, or too often. A person can be at risk for accidents and problems if they drink too much, even if they do not have alcohol use disorder.  Can I stop drinking on my own? -- Many people are able to cut back on drinking on their own. But if you have been drinking several days a week for weeks in a row, do not try to drink less without the help of a doctor or nurse. Stopping or reducing drinking too quickly can cause something called \"alcohol withdrawal.\" This can cause symptoms and, in some cases, even lead to death.  How do I care for myself at home? -- Ask the doctor what you need to do when you go home. Make sure that you understand exactly what you need to do to care for yourself. Ask questions if there is anything you do not understand.   You might need help to quit or limit drinking. Talk with your doctor or nurse about the best plan for you. You might:   See a counselor, such as a psychologist, , or psychiatrist.   Go to a treatment center or special recovery center.   Take part in a recovery program while living at " "home.   Take part in a support group such as Alcoholics Anonymous (\"AA\").  All of these treatments can help, and they can be combined.   There are many things you can do to manage your drinking and lower your risk for problems. Some examples:   Some people choose to stop drinking alcohol completely. But if you have been drinking several days a week for weeks in a row, do not try to reduce without the help of a doctor or nurse. Stopping drinking abruptly can lead to withdrawal.   Other people continue to drink with some limits. Talk with your doctor or nurse to find the best option for you. If you do drink:   Limit the amount you drink, or alternate your drinks with a glass of water or other non-alcoholic drink.   Do not drink on an empty stomach. Food helps your body absorb alcohol more slowly.   Never drive if you have been drinking.   Talk to a friend or family member that you feel comfortable with and who can help you stay responsible and not drink.   Keep a drink journal. Write down how much you drink, where you were, and anything that might have triggered your drinking. This can help you learn more about your drinking patterns and make changes.   Make some changes in your daily habits, and try a new routine:   Avoid places, people, and situations that bring up thoughts of drinking.   Spend time with people who are not drinking alcohol. It can help to be with people who support your recovery and give you comfort, encouragement, and guidance.  What follow-up care do I need? -- Your doctor or counselor might want to check on your progress. Go to these appointments. Be honest with them about your progress, how you are feeling, and any problems.  When should I call the doctor? -- Alcohol poisoning is an emergency. Call for an ambulance (in the [place] and [place], call 9-1-1) if someone:   Stops breathing, or goes 10 seconds or more without breathing   Breathes very slowly (fewer than 8 breaths in 1 minute)   Turns blue " "or very pale, and their skin feels cool to the touch   Has a seizure   Passes out and cannot be woken up at all   Cannot stop vomiting   Looks very sick   Is not able to stand, or falls over and over again   Has a head injury, or a fall that could lead to a head injury  Delirium tremens (\"DTs\") is the most serious form of alcohol withdrawal. This is also an emergency. Call for an ambulance (in the [place] and [place], call 9-1-1) if someone:   Has hallucinations - This is when a person sees, hears, feels, smells, or tastes things that aren't there.   Is confused about where they are or who they are   Feels very upset and anxious   Has shaking that cannot be controlled   Has a fast heartbeat   Has a fever of 100.4°F (38°C) or higher   Is sweating a lot  Call your doctor for advice if you have milder symptoms of alcohol withdrawal. These might include:   Trouble sleeping   Headache   Nausea  You should also call your doctor if you are having trouble drinking less, or if you have any other symptoms that worry you.  All topics are updated as new evidence becomes available and our peer review process is complete.  This topic retrieved from [employer] on: Apr 11, 2024.  Topic 903370 Version 2.0  Release: 32.3.2 - C32.100  © 2024 UpToDate, Inc. and/or its affiliates. All rights reserved.  Consumer Information Use and Disclaimer   Disclaimer: This generalized information is a limited summary of diagnosis, treatment, and/or medication information. It is not meant to be comprehensive and should be used as a tool to help the user understand and/or assess potential diagnostic and treatment options. It does NOT include all information about conditions, treatments, medications, side effects, or risks that may apply to a specific patient. It is not intended to be medical advice or a substitute for the medical advice, diagnosis, or treatment of a health care provider based on the health care provider's examination and assessment of a " "patient's specific and unique circumstances. Patients must speak with a health care provider for complete information about their health, medical questions, and treatment options, including any risks or benefits regarding use of medications. This information does not endorse any treatments or medications as safe, effective, or approved for treating a specific patient. UpToDate, Inc. and its affiliates disclaim any warranty or liability relating to this information or the use thereof.The use of this information is governed by the Terms of Use, available at https://www.[employer].com/en/know/clinical-effectiveness-terms. 2024© UpToDate, Inc. and its affiliates and/or licensors. All rights reserved.  Copyright   © 2024 UpToDate, Inc. and/or its affiliates. All rights reserved.            Follow up with PCP in 3-5 days.  Proceed to  ER if symptoms worsen.    Chief Complaint     Chief Complaint   Patient presents with    Groin Pain     Patient c/o groin pain that started \"at least two years ago.\"  Patient reports drinking wine frequently to help with pain and sleep.  Patient reports going through gallon of wine a week.  Patient reports not seeing a doctor for at least 6-7 years.  Patient c/o groin pain worsening and being unable to urinate over the last ten days.      Loss of Appetite     Patient unable to identify when this started.  Patient reports tolerating a few bites of soup and half a can of soda a day.  Patient reports \"stomach does not have room for anything else.\"  Patient holding jeans up, appears unkept.     Foot Swelling     Patient presents with BL foot swelling.  Patient has cut shoes to be able to wear them.  Patient reports this started in 2004.           History of Present Illness     10 day hx only voiding small amounts/drops at a time--reports feeling full.  Estimates last PCP visit 6-7 years ago and another 5 years before that. Uses 1/2 glasses of wine often to help manage ongoing pain--abdomen, " generalized in genitalia and anus, etc; estimates he drinks about 1 gallon of wine per week total. Re: the groin pain--states he has had discomfort of entire genitalia/perineum/anus for quite some time--called prior PCP 2? Years ago (3 years on chart) but that it was occurring at least 2 years before that.  Denies rash or external abnormalities; no acute changes other than the trouble voiding these last 10 days.  Notes worsening early satiety consuming small amounts of soup and soda often not finishing broth. Used prune juice a few days ago and had a bowel movement/urinary symptoms briefly felt slightly improved but then have worsened again. Ongoing swelling both legs and feet for years.  Expresses phobia re: going up to [facility]--states he has not left the area (lives in [place]) in years--25 years since he was last in [place].  Used to be able to go to [place].  States he has a lot of trouble going out.          Review of Systems     Review of Systems   Constitutional:  Positive for appetite change.   Cardiovascular:  Positive for leg swelling.   Gastrointestinal:  Positive for abdominal pain.   Genitourinary:  Positive for difficulty urinating, penile pain and testicular pain. Negative for decreased urine volume, dysuria, enuresis, flank pain, frequency, genital sores, hematuria, penile discharge, penile swelling, scrotal swelling and urgency.   All other systems reviewed and are negative.        Current Medications     No current facility-administered medications for this visit.    Current Outpatient Medications:     LORazepam (ATIVAN) 1 mg tablet, Take 2 tablets (2 mg total) by mouth 1 (one) time for 1 dose Do not drive after taking this (Patient not taking: Reported on 11/19/2024), Disp: 2 tablet, Rfl: 0    Facility-Administered Medications Ordered in Other Visits:     sodium chloride 0.9 % bolus 1,000 mL, 1,000 mL, Intravenous, Once, Wesley Berrios MD, Last Rate: 500 mL/hr at 11/19/24 2617,  1,000 mL at 11/19/24 1447    Current Allergies     Allergies as of 11/19/2024 - Reviewed 11/19/2024   Allergen Reaction Noted    Shellfish-derived products - food allergy GI Intolerance 11/19/2024    Stelazine [trifluoperazine] Tongue Swelling 11/19/2024              The following portions of the patient's history were reviewed and updated as appropriate: allergies, current medications, past family history, past medical history, past social history, past surgical history and problem list.     No past medical history on file.    No past surgical history on file.    No family history on file.      Medications have been verified.        Objective     /74   Pulse (!) 107   Temp 98.4 °F (36.9 °C) (Temporal)   Resp 20   Wt 44.4 kg (97 lb 12.8 oz)   SpO2 98%   No LMP for male patient.         Physical Exam     Physical Exam  Vitals and nursing note reviewed.   Constitutional:       General: He is not in acute distress.     Appearance: He is cachectic. He is ill-appearing (chronic, not acute). He is not toxic-appearing or diaphoretic.      Comments: Disheveled appearance--holding his too large jeans up; shoes cut adapted to allow swollen feet to fit.   HENT:      Head: Normocephalic and atraumatic.   Eyes:      Pupils: Pupils are equal, round, and reactive to light.   Cardiovascular:      Rate and Rhythm: Normal rate and regular rhythm.      Heart sounds: Normal heart sounds, S1 normal and S2 normal. No murmur heard.     No friction rub. No gallop.   Pulmonary:      Effort: Pulmonary effort is normal. No tachypnea, bradypnea, accessory muscle usage or respiratory distress.      Breath sounds: Normal breath sounds. No stridor. No decreased breath sounds, wheezing, rhonchi or rales.   Chest:      Chest wall: No tenderness.   Abdominal:      General: Abdomen is scaphoid. Bowel sounds are normal. There is distension.      Palpations: Abdomen is soft.      Tenderness: There is abdominal tenderness in the suprapubic  area.      Comments: Abdomen sunken in with lower abdominal fullness, distention, roundness--suspected bladder distention although could be secondary to malnutrition, groin issues, etc.  No bladder scanner nor catheters at urgent care.   Musculoskeletal:         General: Normal range of motion.      Cervical back: Normal range of motion and neck supple.      Right lower le+ Pitting Edema present.      Left lower le+ Pitting Edema present.   Skin:     General: Skin is warm and dry.      Capillary Refill: Capillary refill takes less than 2 seconds.   Neurological:      Mental Status: He is alert and oriented to person, place, and time.   Psychiatric:         Behavior: Behavior normal. Behavior is cooperative.         Thought Content: Thought content normal.         Judgment: Judgment normal.

## 2024-11-21 LAB
ATRIAL RATE: 100 BPM
P AXIS: 82 DEGREES
PR INTERVAL: 122 MS
QRS AXIS: 164 DEGREES
QRSD INTERVAL: 72 MS
QT INTERVAL: 352 MS
QTC INTERVAL: 454 MS
T WAVE AXIS: 79 DEGREES
VENTRICULAR RATE: 100 BPM

## 2024-11-21 PROCEDURE — 93010 ELECTROCARDIOGRAM REPORT: CPT | Performed by: INTERNAL MEDICINE

## 2024-11-21 NOTE — ED PROVIDER NOTES
Time reflects when diagnosis was documented in both MDM as applicable and the Disposition within this note       Time User Action Codes Description Comment    11/19/2024  3:33 PM Wesley Berrios Add [N39.0] UTI (urinary tract infection)           ED Disposition       ED Disposition   Discharge    Condition   Stable    Date/Time   Tue Nov 19, 2024  3:33 PM    Comment   Joshua Aly discharge to home/self care.                   Assessment & Plan       Medical Decision Making  Patient is a 69-year-old male who presents for evaluation of weak urinary stream and frequent urination.  Blood work reviewed and renal function is normal.  Bedside ultrasound showed that the patient was emptying his bladder appropriately and there was no urinary retention.  Patient found to have urinary tract infection, will treat with antibiotics and given PCP follow-up to establish care.  Advised strict return precautions.    Amount and/or Complexity of Data Reviewed  Labs: ordered.  Radiology: ordered.    Risk  Prescription drug management.             Medications   sodium chloride 0.9 % bolus 1,000 mL (0 mL Intravenous Stopped 11/19/24 1542)   cefuroxime (CEFTIN) tablet 500 mg (500 mg Oral Given 11/19/24 1541)       ED Risk Strat Scores                           SBIRT 22yo+      Flowsheet Row Most Recent Value   Initial Alcohol Screen: US AUDIT-C     1. How often do you have a drink containing alcohol? 0 Filed at: 11/19/2024 1407   2. How many drinks containing alcohol do you have on a typical day you are drinking?  0 Filed at: 11/19/2024 1407   3a. Male UNDER 65: How often do you have five or more drinks on one occasion? 0 Filed at: 11/19/2024 1407   Audit-C Score 0 Filed at: 11/19/2024 1407   MIRYAM: How many times in the past year have you...    Used an illegal drug or used a prescription medication for non-medical reasons? Never Filed at: 11/19/2024 1407                            History of Present Illness       Chief Complaint    Patient presents with    Abdominal Pain    Urinary Retention       History reviewed. No pertinent past medical history.   History reviewed. No pertinent surgical history.   History reviewed. No pertinent family history.   Social History     Tobacco Use    Smoking status: Never    Smokeless tobacco: Never   Substance Use Topics    Alcohol use: Yes     Alcohol/week: 2.0 standard drinks of alcohol     Types: 2 Glasses of wine per week     Comment: pt reports a gallon of wine a week    Drug use: Not Currently      E-Cigarette/Vaping      E-Cigarette/Vaping Substances      I have reviewed and agree with the history as documented.     Patient is a 69-year-old male that presents for evaluation of decreased urination.  There was some concern that he could have a obstruction in the retaining urine, he was sent in from urgent care for further evaluation.  Patient says that he urinates frequently but also dribbles urine and does not have a very strong stream.  This is an ongoing issue but has been getting worse over the past several months.  He has not seen a doctor in many years.  He otherwise denies nausea vomiting chest pain dyspnea abdominal pain.    Patient also found to have bilateral lower extremity swelling that he has had for over 20 years he says.  Unchanged from baseline.  He is on a diuretic that he stopped taking.        Review of Systems   Constitutional:  Negative for fever.   HENT:  Negative for sore throat.    Eyes:  Negative for photophobia.   Respiratory:  Negative for shortness of breath.    Cardiovascular:  Positive for leg swelling. Negative for chest pain.   Gastrointestinal:  Negative for abdominal pain.   Genitourinary:  Negative for dysuria.   Musculoskeletal:  Negative for back pain.   Skin:  Negative for rash.   Neurological:  Negative for light-headedness.   Hematological:  Negative for adenopathy.   Psychiatric/Behavioral:  Negative for agitation.    All other systems reviewed and are  negative.          Objective       ED Triage Vitals [11/19/24 1403]   Temperature Pulse Blood Pressure Respirations SpO2 Patient Position - Orthostatic VS   98.2 °F (36.8 °C) (!) 108 135/87 18 98 % Sitting      Temp Source Heart Rate Source BP Location FiO2 (%) Pain Score    Tympanic Monitor Right arm -- No Pain      Vitals      Date and Time Temp Pulse SpO2 Resp BP Pain Score FACES Pain Rating User   11/19/24 1450 -- 92 -- -- -- -- --    11/19/24 1405 -- -- 98 % -- -- -- --    11/19/24 1403 98.2 °F (36.8 °C) 108 98 % 18 135/87 No Pain --             Physical Exam  Vitals reviewed.   Constitutional:       General: He is not in acute distress.     Appearance: He is well-developed.   HENT:      Head: Normocephalic.   Eyes:      Pupils: Pupils are equal, round, and reactive to light.   Cardiovascular:      Rate and Rhythm: Normal rate and regular rhythm.      Heart sounds: Normal heart sounds. No murmur heard.     No friction rub. No gallop.   Pulmonary:      Effort: Pulmonary effort is normal.      Breath sounds: Normal breath sounds.   Abdominal:      General: Bowel sounds are normal. There is no distension.      Palpations: Abdomen is soft.      Tenderness: There is no abdominal tenderness. There is no guarding.   Musculoskeletal:         General: Normal range of motion.      Cervical back: Normal range of motion and neck supple.   Skin:     Capillary Refill: Capillary refill takes less than 2 seconds.      Comments: 2+ pitting edema bilateral lower extremities   Neurological:      Mental Status: He is alert and oriented to person, place, and time.      Cranial Nerves: No cranial nerve deficit.      Sensory: No sensory deficit.      Motor: No abnormal muscle tone.   Psychiatric:         Behavior: Behavior normal.         Thought Content: Thought content normal.         Judgment: Judgment normal.         Results Reviewed       Procedure Component Value Units Date/Time    Urine Microscopic [[id number]]   (Abnormal) Collected: 11/19/24 1512    Lab Status: Final result Specimen: Urine, Clean Catch Updated: 11/19/24 1529     RBC, UA 20-30 /hpf      WBC, UA 4-10 /hpf      Epithelial Cells Occasional /hpf      Bacteria, UA Moderate /hpf      Ca Oxalate Germaine, UA Occasional /hpf      MUCUS THREADS Moderate    UA w Reflex to Microscopic w Reflex to Culture [[id number]]  (Abnormal) Collected: 11/19/24 1512    Lab Status: Final result Specimen: Urine, Clean Catch Updated: 11/19/24 1519     Color, UA Lulu     Clarity, UA Slightly Cloudy     Specific Gravity, UA >=1.030     pH, UA 6.0     Leukocytes, UA Negative     Nitrite, UA Negative     Protein, UA 1+ mg/dl      Glucose, UA Negative mg/dl      Ketones, UA 15 (1+) mg/dl      Urobilinogen, UA 1.0 E.U./dl      Bilirubin, UA Negative     Occult Blood, UA 3+    B-Type Natriuretic Peptide(BNP) [[id number]]  (Normal) Collected: 11/19/24 1415    Lab Status: Final result Specimen: Blood from Arm, Left Updated: 11/19/24 1449     BNP 66 pg/mL     Basic metabolic panel [[id number]] Collected: 11/19/24 1415    Lab Status: Final result Specimen: Blood from Arm, Left Updated: 11/19/24 1435     Sodium 138 mmol/L      Potassium 4.2 mmol/L      Chloride 103 mmol/L      CO2 24 mmol/L      ANION GAP 11 mmol/L      BUN 8 mg/dL      Creatinine 0.87 mg/dL      Glucose 107 mg/dL      Calcium 9.0 mg/dL      eGFR 88 ml/min/1.73sq m     Narrative:      National Kidney Disease Foundation guidelines for Chronic Kidney Disease (CKD):     Stage 1 with normal or high GFR (GFR > 90 mL/min/1.73 square meters)    Stage 2 Mild CKD (GFR = 60-89 mL/min/1.73 square meters)    Stage 3A Moderate CKD (GFR = 45-59 mL/min/1.73 square meters)    Stage 3B Moderate CKD (GFR = 30-44 mL/min/1.73 square meters)    Stage 4 Severe CKD (GFR = 15-29 mL/min/1.73 square meters)    Stage 5 End Stage CKD (GFR <15 mL/min/1.73 square meters)  Note: GFR calculation is accurate only with a steady state creatinine    CBC and differential  [[id number]]  (Abnormal) Collected: 11/19/24 1415    Lab Status: Final result Specimen: Blood from Arm, Left Updated: 11/19/24 1424     WBC 8.06 Thousand/uL      RBC 4.89 Million/uL      Hemoglobin 17.3 g/dL      Hematocrit 50.5 %       fL      MCH 35.4 pg      MCHC 34.3 g/dL      RDW 13.9 %      MPV 9.6 fL      Platelets 224 Thousands/uL      nRBC 0 /100 WBCs      Segmented % 76 %      Immature Grans % 0 %      Lymphocytes % 15 %      Monocytes % 7 %      Eosinophils Relative 2 %      Basophils Relative 0 %      Absolute Neutrophils 6.12 Thousands/µL      Absolute Immature Grans 0.02 Thousand/uL      Absolute Lymphocytes 1.22 Thousands/µL      Absolute Monocytes 0.52 Thousand/µL      Eosinophils Absolute 0.16 Thousand/µL      Basophils Absolute 0.02 Thousands/µL             XR chest 1 view portable   Final Interpretation by Marin Milton MD (11/19 2299)      No acute cardiopulmonary disease.            Workstation performed: [id number]             Procedures    ED Medication and Procedure Management   Prior to Admission Medications   Prescriptions Last Dose Informant Patient Reported? Taking?   LORazepam (ATIVAN) 1 mg tablet Not Taking  No No   Sig: Take 2 tablets (2 mg total) by mouth 1 (one) time for 1 dose Do not drive after taking this   Patient not taking: Reported on 11/19/2024      Facility-Administered Medications: None     Discharge Medication List as of 11/19/2024  3:35 PM        START taking these medications    Details   cefuroxime (CEFTIN) 500 mg tablet Take 1 tablet (500 mg total) by mouth every 12 (twelve) hours for 7 days, Starting Tue 11/19/2024, Until Tue 11/26/2024, Normal           CONTINUE these medications which have NOT CHANGED    Details   LORazepam (ATIVAN) 1 mg tablet Take 2 tablets (2 mg total) by mouth 1 (one) time for 1 dose Do not drive after taking this, Starting Tue 11/19/2024, Normal             ED SEPSIS DOCUMENTATION   Time reflects when diagnosis was documented in both  MDM as applicable and the Disposition within this note       Time User Action Codes Description Comment    11/19/2024  3:33 PM Wesley Berrios Add [N39.0] UTI (urinary tract infection)                  Wesley Berrios MD  11/21/24 3131

## 2024-11-22 LAB — BACTERIA UR CULT: ABNORMAL
